# Patient Record
Sex: FEMALE | Race: BLACK OR AFRICAN AMERICAN | Employment: UNEMPLOYED | URBAN - METROPOLITAN AREA
[De-identification: names, ages, dates, MRNs, and addresses within clinical notes are randomized per-mention and may not be internally consistent; named-entity substitution may affect disease eponyms.]

---

## 2019-11-07 ENCOUNTER — HOSPITAL ENCOUNTER (OUTPATIENT)
Dept: PHYSICAL THERAPY | Age: 34
End: 2019-11-07

## 2019-11-20 ENCOUNTER — APPOINTMENT (OUTPATIENT)
Dept: PHYSICAL THERAPY | Age: 34
End: 2019-11-20

## 2021-09-22 ENCOUNTER — APPOINTMENT (OUTPATIENT)
Dept: CT IMAGING | Age: 36
End: 2021-09-22
Attending: PHYSICIAN ASSISTANT
Payer: MEDICAID

## 2021-09-22 ENCOUNTER — HOSPITAL ENCOUNTER (EMERGENCY)
Age: 36
Discharge: HOME | End: 2021-09-22
Attending: STUDENT IN AN ORGANIZED HEALTH CARE EDUCATION/TRAINING PROGRAM
Payer: MEDICAID

## 2021-09-22 VITALS
HEIGHT: 62 IN | WEIGHT: 279 LBS | OXYGEN SATURATION: 100 % | TEMPERATURE: 97.9 F | BODY MASS INDEX: 51.34 KG/M2 | RESPIRATION RATE: 20 BRPM | HEART RATE: 71 BPM | SYSTOLIC BLOOD PRESSURE: 144 MMHG | DIASTOLIC BLOOD PRESSURE: 69 MMHG

## 2021-09-22 DIAGNOSIS — J03.90 ACUTE TONSILLITIS, UNSPECIFIED ETIOLOGY: Primary | ICD-10-CM

## 2021-09-22 LAB
ALBUMIN SERPL-MCNC: 3.1 G/DL (ref 3.4–5)
ALBUMIN/GLOB SERPL: 0.6 {RATIO} (ref 0.8–1.7)
ALP SERPL-CCNC: 52 U/L (ref 45–117)
ALT SERPL-CCNC: 35 U/L (ref 13–56)
ANION GAP SERPL CALC-SCNC: 9 MMOL/L (ref 3–18)
AST SERPL-CCNC: 33 U/L (ref 10–38)
BASOPHILS # BLD: 0.1 K/UL (ref 0–0.1)
BASOPHILS NFR BLD: 1 % (ref 0–2)
BILIRUB SERPL-MCNC: 0.8 MG/DL (ref 0.2–1)
BUN SERPL-MCNC: 10 MG/DL (ref 7–18)
BUN/CREAT SERPL: 16 (ref 12–20)
CALCIUM SERPL-MCNC: 8.3 MG/DL (ref 8.5–10.1)
CHLORIDE SERPL-SCNC: 106 MMOL/L (ref 100–111)
CO2 SERPL-SCNC: 20 MMOL/L (ref 21–32)
CREAT SERPL-MCNC: 0.63 MG/DL (ref 0.6–1.3)
DIFFERENTIAL METHOD BLD: ABNORMAL
EOSINOPHIL # BLD: 0.2 K/UL (ref 0–0.4)
EOSINOPHIL NFR BLD: 4 % (ref 0–5)
ERYTHROCYTE [DISTWIDTH] IN BLOOD BY AUTOMATED COUNT: 13.2 % (ref 11.6–14.5)
GLOBULIN SER CALC-MCNC: 5 G/DL (ref 2–4)
GLUCOSE SERPL-MCNC: 79 MG/DL (ref 74–99)
HCT VFR BLD AUTO: 29.6 % (ref 35–45)
HGB BLD-MCNC: 9.8 G/DL (ref 12–16)
LYMPHOCYTES # BLD: 2.3 K/UL (ref 0.9–3.6)
LYMPHOCYTES NFR BLD: 43 % (ref 21–52)
MCH RBC QN AUTO: 28.4 PG (ref 24–34)
MCHC RBC AUTO-ENTMCNC: 33.1 G/DL (ref 31–37)
MCV RBC AUTO: 85.8 FL (ref 78–100)
MONOCYTES # BLD: 0.4 K/UL (ref 0.05–1.2)
MONOCYTES NFR BLD: 7 % (ref 3–10)
NEUTS SEG # BLD: 2.4 K/UL (ref 1.8–8)
NEUTS SEG NFR BLD: 45 % (ref 40–73)
PLATELET # BLD AUTO: 420 K/UL (ref 135–420)
PMV BLD AUTO: 10.3 FL (ref 9.2–11.8)
POTASSIUM SERPL-SCNC: 4.3 MMOL/L (ref 3.5–5.5)
PROT SERPL-MCNC: 8.1 G/DL (ref 6.4–8.2)
RBC # BLD AUTO: 3.45 M/UL (ref 4.2–5.3)
SODIUM SERPL-SCNC: 135 MMOL/L (ref 136–145)
WBC # BLD AUTO: 5.3 K/UL (ref 4.6–13.2)

## 2021-09-22 PROCEDURE — 74011000636 HC RX REV CODE- 636: Performed by: STUDENT IN AN ORGANIZED HEALTH CARE EDUCATION/TRAINING PROGRAM

## 2021-09-22 PROCEDURE — 85025 COMPLETE CBC W/AUTO DIFF WBC: CPT

## 2021-09-22 PROCEDURE — 80053 COMPREHEN METABOLIC PANEL: CPT

## 2021-09-22 PROCEDURE — 70491 CT SOFT TISSUE NECK W/DYE: CPT

## 2021-09-22 PROCEDURE — 99282 EMERGENCY DEPT VISIT SF MDM: CPT

## 2021-09-22 RX ORDER — AMOXICILLIN 875 MG/1
875 TABLET, FILM COATED ORAL 2 TIMES DAILY
Qty: 20 TABLET | Refills: 0 | Status: SHIPPED | OUTPATIENT
Start: 2021-09-22 | End: 2021-10-02

## 2021-09-22 RX ADMIN — IOPAMIDOL 80 ML: 612 INJECTION, SOLUTION INTRAVENOUS at 17:26

## 2021-09-22 NOTE — ED NOTES
Pt ambulated to room 11. Pt reports difficulty swallowing, denies sore throat/throat pain, constant since beginning one week ago. Pt denies feeling short of breath at any point. Pt denies any coughing. She reports headache, intermittent, described as pressure, generalized \"all over\". She denies fever or any cold symptoms. Easy work of breathing noted, no difficulty speaking. Call bell in reach. Bed in low & locked position. Armband in place.

## 2021-09-22 NOTE — ED PROVIDER NOTES
EMERGENCY DEPARTMENT HISTORY AND PHYSICAL EXAM      Date: 9/22/2021  Patient Name: Susie Buckley    History of Presenting Illness     Chief Complaint   Patient presents with    Neck Pain    Dysphagia       History Provided By: Patient    HPI: Susie Buckley, 31 y.o. female PMHx significant for eclampsia presents ambulatory to the ED. Pt reports dysphagia at times x 1 week. Pt reports \"gasping for air after swallowing sometimes\". Pt states there have not been any foods that have been difficult to eat. Denies pain with swallowing or globus sensation. Denies previous similar sensation. Denies sore throat, fever/chills. Pt has not taken anything for sx. There are no other complaints, changes, or physical findings at this time. PCP: None    No current facility-administered medications on file prior to encounter. Current Outpatient Medications on File Prior to Encounter   Medication Sig Dispense Refill    [DISCONTINUED] ibuprofen (MOTRIN) 800 mg tablet Take 1 Tab by mouth every eight (8) hours as needed for Pain (and inflammation. ). 30 Tab 0    [DISCONTINUED] methocarbamol (ROBAXIN) 750 mg tablet Take 1 Tab by mouth four (4) times daily.  Indications: Muscle Spasm 15 Tab 0       Past History     Past Medical History:  Past Medical History:   Diagnosis Date    Abnormal vaginal bleeding     Acute cystitis     Asymptomatic bacteriuria     Bacterial vaginosis     Cholecystitis     Cloudy urine     Dysmenorrhea     Eclampsia     postpartum condition or complication    Enlarged heart     Malodorous urine     Menorrhagia with regular cycle     Recurrent UTI     Sinusitis     Thickened endometrium     Trichomonas vaginitis     Vaginitis        Past Surgical History:  Past Surgical History:   Procedure Laterality Date    HX CHOLECYSTECTOMY      HX OTHER SURGICAL      none       Family History:  Family History   Problem Relation Age of Onset    Hypertension Mother     Diabetes Mother Social History:  Social History     Tobacco Use    Smoking status: Never Smoker    Smokeless tobacco: Never Used   Substance Use Topics    Alcohol use: Yes     Comment: Occasionally    Drug use: No       Allergies:  No Known Allergies      Review of Systems   Review of Systems   Constitutional: Negative for chills and fever. HENT:        Dysphagia   Respiratory: Negative for shortness of breath. Cardiovascular: Negative for chest pain. Gastrointestinal: Negative for abdominal pain, nausea and vomiting. Genitourinary: Negative for flank pain. Musculoskeletal: Negative for back pain and myalgias. Skin: Negative for color change, pallor, rash and wound. Neurological: Negative for dizziness, weakness and light-headedness. All other systems reviewed and are negative. Physical Exam   Physical Exam  Vitals and nursing note reviewed. Constitutional:       General: She is not in acute distress. Appearance: She is well-developed. Comments: Pt in NAD   HENT:      Head: Normocephalic and atraumatic. Mouth/Throat:      Comments: Tonsils 1+ b/l  No overlying erythema or exudates  Eyes:      Conjunctiva/sclera: Conjunctivae normal.   Cardiovascular:      Rate and Rhythm: Normal rate and regular rhythm. Heart sounds: Normal heart sounds. Pulmonary:      Effort: Pulmonary effort is normal. No respiratory distress. Breath sounds: Normal breath sounds. Abdominal:      General: Bowel sounds are normal. There is no distension. Palpations: Abdomen is soft. Musculoskeletal:         General: Normal range of motion. Skin:     General: Skin is warm. Findings: No rash. Neurological:      Mental Status: She is alert and oriented to person, place, and time.    Psychiatric:         Behavior: Behavior normal.         Diagnostic Study Results     Labs -     Recent Results (from the past 12 hour(s))   CBC WITH AUTOMATED DIFF    Collection Time: 09/22/21  4:35 PM   Result Value Ref Range    WBC 5.3 4.6 - 13.2 K/uL    RBC 3.45 (L) 4.20 - 5.30 M/uL    HGB 9.8 (L) 12.0 - 16.0 g/dL    HCT 29.6 (L) 35.0 - 45.0 %    MCV 85.8 78.0 - 100.0 FL    MCH 28.4 24.0 - 34.0 PG    MCHC 33.1 31.0 - 37.0 g/dL    RDW 13.2 11.6 - 14.5 %    PLATELET 394 834 - 517 K/uL    MPV 10.3 9.2 - 11.8 FL    NEUTROPHILS 45 40 - 73 %    LYMPHOCYTES 43 21 - 52 %    MONOCYTES 7 3 - 10 %    EOSINOPHILS 4 0 - 5 %    BASOPHILS 1 0 - 2 %    ABS. NEUTROPHILS 2.4 1.8 - 8.0 K/UL    ABS. LYMPHOCYTES 2.3 0.9 - 3.6 K/UL    ABS. MONOCYTES 0.4 0.05 - 1.2 K/UL    ABS. EOSINOPHILS 0.2 0.0 - 0.4 K/UL    ABS. BASOPHILS 0.1 0.0 - 0.1 K/UL    DF AUTOMATED     METABOLIC PANEL, COMPREHENSIVE    Collection Time: 09/22/21  4:35 PM   Result Value Ref Range    Sodium 135 (L) 136 - 145 mmol/L    Potassium 4.3 3.5 - 5.5 mmol/L    Chloride 106 100 - 111 mmol/L    CO2 20 (L) 21 - 32 mmol/L    Anion gap 9 3.0 - 18 mmol/L    Glucose 79 74 - 99 mg/dL    BUN 10 7.0 - 18 MG/DL    Creatinine 0.63 0.6 - 1.3 MG/DL    BUN/Creatinine ratio 16 12 - 20      GFR est AA >60 >60 ml/min/1.73m2    GFR est non-AA >60 >60 ml/min/1.73m2    Calcium 8.3 (L) 8.5 - 10.1 MG/DL    Bilirubin, total 0.8 0.2 - 1.0 MG/DL    ALT (SGPT) 35 13 - 56 U/L    AST (SGOT) 33 10 - 38 U/L    Alk. phosphatase 52 45 - 117 U/L    Protein, total 8.1 6.4 - 8.2 g/dL    Albumin 3.1 (L) 3.4 - 5.0 g/dL    Globulin 5.0 (H) 2.0 - 4.0 g/dL    A-G Ratio 0.6 (L) 0.8 - 1.7         Radiologic Studies -   CT NECK SOFT TISSUE W CONT   Final Result   1. Enlarged palatine tonsils without evidence of peritonsillar collection. CT Results  (Last 48 hours)               09/22/21 1726  CT NECK SOFT TISSUE W CONT Final result    Impression:  1. Enlarged palatine tonsils without evidence of peritonsillar collection.        Narrative:  EXAM: CT of the Neck with IV contrast       INDICATION:  dysphagia, right tonsil larger than the left       TECHNIQUE: CT of the soft tissues of the neck with IV contrast. All CT scans at this facility are performed using dose optimization technique as   appropriate to a performed exam, to include automated exposure control,   adjustment of the mA and/or kV according to patient size (including appropriate   matching for site specific examination) or use of iterative reconstruction   technique. IV contrast: 80 cc of Isovue-300       Comparison: CT of the head dated 1/18/2008       FINDINGS:   The visualized paranasal sinuses are unremarkable. The visualized orbits are   unremarkable. The nasopharynx, hypopharynx and oropharynx are unremarkable. The palatine tonsils are enlarged. No peritonsillar fluid collection identified. The tongue base  is unremarkable. The epiglottis is unremarkable. The pyriform sinuses are normal.  The larynx is   unremarkable. The visualized trachea is unremarkable. The parotid glands are symmetric and unremarkable. No enlarged lymph nodes   appreciated in the soft tissues of the neck. No fluid collection appreciated. No soft tissue masses are found in the soft tissues of the neck. The visualized   thyroid is unremarkable. The superior mediastinum is unremarkable. The lung apices are unremarkable. CXR Results  (Last 48 hours)    None          Medical Decision Making   I am the first provider for this patient. I reviewed the vital signs, available nursing notes, past medical history, past surgical history, family history and social history. Vital Signs-Reviewed the patient's vital signs. Patient Vitals for the past 12 hrs:   Temp Pulse Resp BP SpO2   09/22/21 1529 97.9 °F (36.6 °C) 71 20 (!) 144/69 100 %       Records Reviewed: Nursing Notes and Old Medical Records    Provider Notes (Medical Decision Making):   DDx: Dysphagia, PTA, Tonsillitis, Pharyngitis, Mass    32 yo F who presents with dysphagia x 3 days. On exam tonsils 1+ b/l. No exudates. Tolerating PO without difficulty.  CT shows tonsillitis with no PTA, mass or additional acute findings. Will treat with abx. Hgb decreased from 2016. Pt recently finished her menstrual cycle, and suspect this is the cause. At time of discharge, pt non-toxic appearing in NAD. Pt stable for prompt outpatient follow-up with PCP 1 to 2 days. Patient given strict instructions to return if symptoms worsen. ED Course:   Initial assessment performed. The patients presenting problems have been discussed, and they are in agreement with the care plan formulated and outlined with them. I have encouraged them to ask questions as they arise throughout their visit. Pt reports her menstrual cycle just ended four days ago. Pt reports heavy menstrual cycles. Denies hematemesis, hematochezia, and melena. Denies CP, SOB, dizziness. Suspect recent menstrual cycle is reason for decreased hgb. Pt tolerated PO without difficulty or emesis. Pt stable for discharge home. Disposition:  7:19 PM  Discussed lab and imaging results with pt along with dx and treatment plan. Discussed importance of PCP follow up. All questions answered. Pt voiced they understood. Return if sx worsen. PLAN:  1. Discharge Medication List as of 9/22/2021  6:50 PM        2. Follow-up Information     Follow up With Specialties Details Why [facility]  Schedule an appointment as soon as possible for a visit in 1 day  [address] [place] [address]  [address] [place] [address]    [id number] [place] [address] EMERGENCY DEPT Emergency Medicine  As needed, If symptoms worsen [address] [place] [address]  [phone]        Return to ED if worse     Diagnosis     Clinical Impression:   1. Acute tonsillitis, unspecified etiology        Attestations:    RASHEL Sy    Please note that this dictation was completed with [employer], the [employer] voice recognition software.   Quite often unanticipated grammatical, syntax, homophones, and other interpretive errors are inadvertently transcribed by the computer software. Please disregard these errors. Please excuse any errors that have escaped final proofreading. Thank you.